# Patient Record
Sex: FEMALE | Race: WHITE | Employment: OTHER | URBAN - METROPOLITAN AREA
[De-identification: names, ages, dates, MRNs, and addresses within clinical notes are randomized per-mention and may not be internally consistent; named-entity substitution may affect disease eponyms.]

---

## 2017-09-02 ENCOUNTER — TELEPHONE (OUTPATIENT)
Dept: INTERNAL MEDICINE CLINIC | Facility: CLINIC | Age: 72
End: 2017-09-02

## 2018-11-30 ENCOUNTER — TELEPHONE (OUTPATIENT)
Dept: INTERNAL MEDICINE CLINIC | Facility: CLINIC | Age: 73
End: 2018-11-30

## 2019-04-09 ENCOUNTER — TELEPHONE (OUTPATIENT)
Dept: INTERNAL MEDICINE CLINIC | Facility: CLINIC | Age: 74
End: 2019-04-09

## 2019-05-06 ENCOUNTER — TELEPHONE (OUTPATIENT)
Dept: INTERNAL MEDICINE CLINIC | Facility: CLINIC | Age: 74
End: 2019-05-06

## 2019-05-06 NOTE — TELEPHONE ENCOUNTER
Pt's daughter states that her mom is in a nursing home long term, can we PLEASE take her off the list. Pt has a PCP in the nursing home.

## 2020-01-04 ENCOUNTER — APPOINTMENT (OUTPATIENT)
Dept: CT IMAGING | Facility: HOSPITAL | Age: 75
End: 2020-01-04
Payer: MEDICARE

## 2020-01-04 ENCOUNTER — HOSPITAL ENCOUNTER (EMERGENCY)
Facility: HOSPITAL | Age: 75
Discharge: HOME | End: 2020-01-04
Attending: EMERGENCY MEDICINE
Payer: MEDICARE

## 2020-01-04 VITALS
BODY MASS INDEX: 18 KG/M2 | WEIGHT: 110.25 LBS | DIASTOLIC BLOOD PRESSURE: 52 MMHG | RESPIRATION RATE: 18 BRPM | OXYGEN SATURATION: 97 % | TEMPERATURE: 98 F | HEART RATE: 79 BPM | SYSTOLIC BLOOD PRESSURE: 114 MMHG

## 2020-01-04 DIAGNOSIS — S00.93XA CONTUSION OF HEAD, UNSPECIFIED PART OF HEAD, INITIAL ENCOUNTER: ICD-10-CM

## 2020-01-04 DIAGNOSIS — S40.812A ARM ABRASION, LEFT, INITIAL ENCOUNTER: ICD-10-CM
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
## 2020-01-04 PROCEDURE — 99284 EMERGENCY DEPT VISIT MOD MDM: CPT

## 2020-01-04 PROCEDURE — 72125 CT NECK SPINE W/O DYE: CPT

## 2020-01-04 PROCEDURE — 70450 CT HEAD/BRAIN W/O DYE: CPT

## 2020-01-04 NOTE — ED NOTES
Patient safe for discharge per provider. Previous RN gave report to patient's nurse at [facility]. [facility] here to take patient.

## 2020-01-04 NOTE — ED INITIAL ASSESSMENT (HPI)
Unwitnessed fall from bed at nursing home. No apparent injury. Here for eval of potential head injury.

## 2020-01-04 NOTE — ED NOTES
Patient care report given to Kristyn at [facility]. Patient with no distress noted. Waiting on transport back to the [place].

## 2020-01-04 NOTE — ED PROVIDER NOTES
Patient Seen in: [facility] Emergency Department    History   Patient presents with:  Fall    Stated Complaint: fall    HPI    Patient complains of mechanical fall that occurred  At nursing home. Patient has alzheimer's cannot provide any info.  Shelia Andrade MG Oral Tab,     GENTAK 0.3 % Ophthalmic Ointment,     NAMENDA XR 21 MG Oral Capsule SR 24 Hr,     NAMENDA XR 7 MG Oral Capsule SR 24 Hr,     mirtazapine 30 MG Oral Tab,     mirtazapine 45 MG Oral Tab,     haloperidol 1 MG Oral Tab,     Memantine HCl 10 MG tenderness, no pain with axial load, ROM intact   LUNGS: no resp distress, cta bilateral  CARDIO: RRR without murmur  GI: abdomen is soft and non tender, no masses, nl bowel sounds   EXTREMITIES: superficial abrasion to l prox forearm, no other evidence of

## 2020-01-04 NOTE — ED NOTES
Writer spoke with Nurse Practitioner from the [facility] who states patient has no complaints and only has to come in because it is their policy that unwitnessed falls go to the ED.  If patient head and neck CT's are normal the patient may be returned to the [facility]

## 2020-01-04 NOTE — CM/SW NOTE
Called by Estefany Crawford to arrange discharge transportation for patient back to [facility] as patient has dementia. Called [facility] arranged - ETA 60MIN. TIFFANIE Bezn updated on ETA.   Called and alerted Tashi Andersen in registration to verify patient's insu

## 2022-03-30 ENCOUNTER — APPOINTMENT (OUTPATIENT)
Dept: CT IMAGING | Facility: HOSPITAL | Age: 77
End: 2022-03-30
Attending: EMERGENCY MEDICINE
Payer: MEDICARE

## 2022-03-30 ENCOUNTER — HOSPITAL ENCOUNTER (EMERGENCY)
Facility: HOSPITAL | Age: 77
Discharge: HOME | End: 2022-03-30
Attending: EMERGENCY MEDICINE
Payer: MEDICARE

## 2022-03-30 VITALS
WEIGHT: 130 LBS | SYSTOLIC BLOOD PRESSURE: 111 MMHG | HEIGHT: 65 IN | RESPIRATION RATE: 16 BRPM | HEART RATE: 83 BPM | DIASTOLIC BLOOD PRESSURE: 83 MMHG | TEMPERATURE: 98 F | BODY MASS INDEX: 21.66 KG/M2 | OXYGEN SATURATION: 96 %

## 2022-03-30 DIAGNOSIS — S01.81XA LACERATION OF FOREHEAD, INITIAL ENCOUNTER: Primary | ICD-10-CM
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
## 2022-03-30 LAB
BILIRUB UR QL: NEGATIVE
COLOR UR: YELLOW
GLUCOSE UR-MCNC: NEGATIVE MG/DL
HGB UR QL STRIP.AUTO: NEGATIVE
KETONES UR-MCNC: NEGATIVE MG/DL
LEUKOCYTE ESTERASE UR QL STRIP.AUTO: NEGATIVE
NITRITE UR QL STRIP.AUTO: NEGATIVE
PH UR: 5 [PH] (ref 5–8)
PROT UR-MCNC: 30 MG/DL
SP GR UR STRIP: 1.02 (ref 1–1.03)
UROBILINOGEN UR STRIP-ACNC: <2
VIT C UR-MCNC: 40 MG/DL

## 2022-03-30 PROCEDURE — 99284 EMERGENCY DEPT VISIT MOD MDM: CPT

## 2022-03-30 PROCEDURE — 70450 CT HEAD/BRAIN W/O DYE: CPT | Performed by: EMERGENCY MEDICINE

## 2022-03-30 PROCEDURE — 12013 RPR F/E/E/N/L/M 2.6-5.0 CM: CPT

## 2022-03-30 PROCEDURE — 72125 CT NECK SPINE W/O DYE: CPT | Performed by: EMERGENCY MEDICINE

## 2022-03-30 PROCEDURE — 81001 URINALYSIS AUTO W/SCOPE: CPT | Performed by: EMERGENCY MEDICINE

## 2022-03-30 NOTE — ED INITIAL ASSESSMENT (HPI)
Pt arrives via EMS from [facility] after an unwitnessed fall at 1450 today. Per EMS, staff said pt was found on the floor next to her bed and that the bed was in the lowest position. EMS also stated that pt was placed back in bed by [facility] staff. Pt is not on blood thinners. Pt is awake, but not oriented to person, place, time or event per baseline. Pt arrives in a c collar placed by EMS. Pt has a laceration above the R eyebrow with bleeding controlled with gauze.

## 2022-03-30 NOTE — ED QUICK NOTES
[facility] called and updated that pt will be transported back to facility. Sutures placed by Dr Chacorta Kim.

## (undated) NOTE — ED AVS SNAPSHOT
Jann Hayden   MRN: [id number]    Department:  [facility] Emergency Department   Date of Visit:  1/4/2020           Disclosure     Insurance plans vary and the physician(s) referred by the ER may not be covered by your plan.  Please contact within the next three months to obtain basic health screening including reassessment of your blood pressure.     IF THERE IS ANY CHANGE OR WORSENING OF YOUR CONDITION, CALL YOUR PRIMARY CARE PHYSICIAN AT ONCE OR RETURN IMMEDIATELY TO THE EMERGENCY DEPARTMEN